# Patient Record
Sex: MALE | Race: BLACK OR AFRICAN AMERICAN | NOT HISPANIC OR LATINO | Employment: OTHER | URBAN - METROPOLITAN AREA
[De-identification: names, ages, dates, MRNs, and addresses within clinical notes are randomized per-mention and may not be internally consistent; named-entity substitution may affect disease eponyms.]

---

## 2017-02-14 ENCOUNTER — TELEPHONE (OUTPATIENT)
Dept: TRANSPLANT | Facility: CLINIC | Age: 32
End: 2017-02-14

## 2017-02-24 DIAGNOSIS — Z76.82 ORGAN TRANSPLANT CANDIDATE: Primary | ICD-10-CM

## 2017-04-12 ENCOUNTER — HOSPITAL ENCOUNTER (OUTPATIENT)
Dept: RADIOLOGY | Facility: HOSPITAL | Age: 32
Discharge: HOME | End: 2017-04-12
Attending: NURSE PRACTITIONER
Payer: MEDICARE

## 2017-04-12 ENCOUNTER — OFFICE VISIT (OUTPATIENT)
Dept: TRANSPLANT | Facility: CLINIC | Age: 32
End: 2017-04-12
Payer: MEDICARE

## 2017-04-12 ENCOUNTER — HOSPITAL ENCOUNTER (OUTPATIENT)
Dept: RADIOLOGY | Facility: HOSPITAL | Age: 32
Discharge: HOME | End: 2017-04-12
Attending: TRANSPLANT SURGERY
Payer: MEDICARE

## 2017-04-12 VITALS
HEIGHT: 72 IN | SYSTOLIC BLOOD PRESSURE: 148 MMHG | DIASTOLIC BLOOD PRESSURE: 96 MMHG | WEIGHT: 231.06 LBS | HEART RATE: 101 BPM | OXYGEN SATURATION: 100 % | BODY MASS INDEX: 31.3 KG/M2 | TEMPERATURE: 98 F | RESPIRATION RATE: 18 BRPM

## 2017-04-12 DIAGNOSIS — Z76.82 ORGAN TRANSPLANT CANDIDATE: ICD-10-CM

## 2017-04-12 DIAGNOSIS — Z01.818 PRE-TRANSPLANT EVALUATION FOR CHRONIC KIDNEY DISEASE: ICD-10-CM

## 2017-04-12 DIAGNOSIS — Z87.898 HISTORY OF SEIZURE: ICD-10-CM

## 2017-04-12 DIAGNOSIS — Z76.82 ORGAN TRANSPLANT CANDIDATE: Primary | ICD-10-CM

## 2017-04-12 DIAGNOSIS — H35.00 RETINOPATHY OF RIGHT EYE: ICD-10-CM

## 2017-04-12 DIAGNOSIS — E55.9 VITAMIN D DEFICIENCY: ICD-10-CM

## 2017-04-12 DIAGNOSIS — Z78.9 KNOWN MEDICAL PROBLEMS: ICD-10-CM

## 2017-04-12 DIAGNOSIS — K31.84 GASTROPARESIS DUE TO DM: ICD-10-CM

## 2017-04-12 DIAGNOSIS — E08.49 OTHER DIABETIC NEUROLOGICAL COMPLICATION ASSOCIATED WITH DIABETES MELLITUS DUE TO UNDERLYING CONDITION: ICD-10-CM

## 2017-04-12 DIAGNOSIS — E11.43 GASTROPARESIS DUE TO DM: ICD-10-CM

## 2017-04-12 DIAGNOSIS — G60.0 CHARCOT-MARIE-TOOTH DISEASE: ICD-10-CM

## 2017-04-12 DIAGNOSIS — E78.00 HIGH CHOLESTEROL: ICD-10-CM

## 2017-04-12 DIAGNOSIS — I15.0 RENOVASCULAR HYPERTENSION: ICD-10-CM

## 2017-04-12 DIAGNOSIS — N18.5 STAGE 5 CHRONIC KIDNEY DISEASE: Chronic | ICD-10-CM

## 2017-04-12 DIAGNOSIS — N28.9 NEPHROPATHY: ICD-10-CM

## 2017-04-12 PROBLEM — E11.21 TYPE 2 DIABETES MELLITUS WITH DIABETIC NEPHROPATHY: Chronic | Status: ACTIVE | Noted: 2017-04-12

## 2017-04-12 PROBLEM — E66.9 OBESITY (BMI 30-39.9): Chronic | Status: ACTIVE | Noted: 2017-04-12

## 2017-04-12 PROCEDURE — 76770 US EXAM ABDO BACK WALL COMP: CPT | Mod: TC,TXP

## 2017-04-12 PROCEDURE — 97802 MEDICAL NUTRITION INDIV IN: CPT | Mod: S$GLB,TXP,, | Performed by: DIETITIAN, REGISTERED

## 2017-04-12 PROCEDURE — 76700 US EXAM ABDOM COMPLETE: CPT | Mod: 26,TXP,, | Performed by: RADIOLOGY

## 2017-04-12 PROCEDURE — 71020 XR CHEST PA AND LATERAL: CPT | Mod: 26,TXP,, | Performed by: RADIOLOGY

## 2017-04-12 PROCEDURE — 93978 VASCULAR STUDY: CPT | Mod: 26,TXP,, | Performed by: RADIOLOGY

## 2017-04-12 PROCEDURE — 99204 OFFICE O/P NEW MOD 45 MIN: CPT | Mod: S$GLB,TXP,, | Performed by: NURSE PRACTITIONER

## 2017-04-12 PROCEDURE — 99215 OFFICE O/P EST HI 40 MIN: CPT | Mod: S$GLB,TXP,, | Performed by: NURSE PRACTITIONER

## 2017-04-12 PROCEDURE — 99499 UNLISTED E&M SERVICE: CPT | Mod: S$GLB,TXP,, | Performed by: NURSE PRACTITIONER

## 2017-04-12 PROCEDURE — 99999 PR PBB SHADOW E&M-EST. PATIENT-LVL IV: CPT | Mod: PBBFAC,TXP,, | Performed by: NURSE PRACTITIONER

## 2017-04-12 PROCEDURE — 99203 OFFICE O/P NEW LOW 30 MIN: CPT | Mod: S$GLB,TXP,, | Performed by: TRANSPLANT SURGERY

## 2017-04-12 PROCEDURE — 76700 US EXAM ABDOM COMPLETE: CPT | Mod: TC,TXP

## 2017-04-12 PROCEDURE — 93978 VASCULAR STUDY: CPT | Mod: TC,TXP

## 2017-04-12 PROCEDURE — 72170 X-RAY EXAM OF PELVIS: CPT | Mod: 26,TXP,, | Performed by: RADIOLOGY

## 2017-04-12 PROCEDURE — 1160F RVW MEDS BY RX/DR IN RCRD: CPT | Mod: S$GLB,TXP,, | Performed by: NURSE PRACTITIONER

## 2017-04-12 PROCEDURE — 99499 UNLISTED E&M SERVICE: CPT | Mod: S$GLB,TXP,, | Performed by: TRANSPLANT SURGERY

## 2017-04-12 PROCEDURE — 72170 X-RAY EXAM OF PELVIS: CPT | Mod: TC,TXP

## 2017-04-12 PROCEDURE — 71020 XR CHEST PA AND LATERAL: CPT | Mod: TC,TXP

## 2017-04-12 PROCEDURE — 76770 US EXAM ABDO BACK WALL COMP: CPT | Mod: 26,TXP,, | Performed by: RADIOLOGY

## 2017-04-12 PROCEDURE — 4010F ACE/ARB THERAPY RXD/TAKEN: CPT | Mod: S$GLB,TXP,, | Performed by: NURSE PRACTITIONER

## 2017-04-12 PROCEDURE — 3044F HG A1C LEVEL LT 7.0%: CPT | Mod: S$GLB,TXP,, | Performed by: NURSE PRACTITIONER

## 2017-04-12 RX ORDER — METOCLOPRAMIDE 10 MG/1
10 TABLET ORAL 2 TIMES DAILY
COMMUNITY

## 2017-04-12 RX ORDER — OMEPRAZOLE 40 MG/1
40 CAPSULE, DELAYED RELEASE ORAL DAILY
COMMUNITY

## 2017-04-12 RX ORDER — LISINOPRIL 40 MG/1
40 TABLET ORAL DAILY
COMMUNITY

## 2017-04-12 RX ORDER — SIMVASTATIN 20 MG/1
20 TABLET, FILM COATED ORAL NIGHTLY
COMMUNITY

## 2017-04-12 NOTE — PROGRESS NOTES
"PRE-TRANSPLANT NOTE:    This patient's medication therapy was evaluated as part of his pre-transplant evaluation.    The following pharmacologic concerns were noted: None    Current Outpatient Prescriptions   Medication Sig Dispense Refill    amlodipine (NORVASC) 10 MG tablet Take 10 mg by mouth once daily.   6    BD ULTRA-FINE CHIRAG PEN NEEDLES 32 x 5/32 " Ndle   0    blood sugar diagnostic (FREESTYLE TEST) Strp To test blood sugar before meals and at bedtime 200 strip 11    insulin aspart (NOVOLOG FLEXPEN) 100 unit/mL InPn pen Inject 10 Units into the skin 3 (three) times daily with meals. Plus sliding scale 1 Box 6    insulin detemir (LEVEMIR) 100 unit/mL injection Inject 30 Units into the skin 2 (two) times daily.      insulin needles, disposable, 32 x 5/32 " Ndle Use with multiple daily insulin injections 200 each 12    lancets (ONETOUCH DELICA LANCETS) 33 gauge Misc 1 lancet by Misc.(Non-Drug; Combo Route) route 4 (four) times daily before meals and nightly. 200 each 11    lisinopril (PRINIVIL,ZESTRIL) 40 MG tablet Take 40 mg by mouth once daily.      metoclopramide HCl (REGLAN) 10 MG tablet Take 10 mg by mouth 4 (four) times daily.      omeprazole (PRILOSEC) 40 MG capsule Take 40 mg by mouth once daily.      promethazine (PHENERGAN) 25 MG tablet Take 1 tablet (25 mg total) by mouth every 6 (six) hours as needed for Nausea. 90 tablet 2    simvastatin (ZOCOR) 20 MG tablet Take 20 mg by mouth every evening.      COMBIGAN 0.2-0.5 % Drop   2    hydrochlorothiazide (HYDRODIURIL) 12.5 MG Tab Take 1 tablet (12.5 mg total) by mouth once daily. 30 tablet 11    hydrocodone-acetaminophen 7.5-325mg (NORCO) 7.5-325 mg per tablet   0     No current facility-administered medications for this visit.        I am available for consultation and can be contacted, as needed by the other members of the [facility] team.     "

## 2017-04-12 NOTE — LETTER
April 13, 2017        Prabhjot Arce  [address]  [place] [address]  Phone: [phone]  Fax: [phone]             [facility]  [address]  [facility] [address]  Phone: [phone]   Patient: Mian Ball Jr.   MR Number: [id number]   YOB: 1985   Date of Visit: 4/12/2017       Dear Dr. Prabhjot Arce    Thank you for referring Mian Ball to me for evaluation. Attached you will find relevant portions of my assessment and plan of care.    If you have questions, please do not hesitate to call me. I look forward to following Mian Ball along with you.    Sincerely,    Salena Mane NP    Enclosure    If you would like to receive this communication electronically, please contact [email or web address] or [phone] to request [employer] access.    [employer] is a tool which provides read-only access to select patient information with whom you have a relationship. Its easy to use and provides real time access to review your patients record including encounter summaries, notes, results, and demographic information.    If you feel you have received this communication in error or would no longer like to receive these types of communications, please e-mail [email or web address]

## 2017-04-12 NOTE — PROGRESS NOTES
Transplant Recipient Adult Psychosocial Assessment    Mian Ball  [address]  [address]  [facility] [address]    Telephone Information:   Mobile [phone]   Home  [phone] (home)  Work  There is no work phone number on file.  E-mail  No e-mail address on record    Sex: male  YOB: 1985  Age: 31 y.o.    Encounter Date: 2017  [place] Citizen: yes  Primary Language: English   Needed: no    Emergency Contact:  Name: Laure Munoz  Relationship: mother  Address: same as pt  Phone Numbers:  [phone] (home)    Family/Social Support:   Number of dependents/: Pt reports no dependents   Marital history: Pt denies marital history and current relationship  Other family dynamics: Pt presents with his mother Laure. Pt also lives with his mother. Pt's father is  and the family is coping adequately. Pt reports is the eldest of 5 highly supportive siblings: Corbin, Candida,Lesvia,Major and Abdirizak. Pt reports all sisters live in [place], [place] and brothers live in [place].  Pt report family is highly supportive and communicative.     Household Composition:  Name: Laure Munoz  Age: none provided   Relationship: mother  Does person drive? yes    Do you and your caregivers have access to reliable transportation? yes Pt reports he and mother share transportation  PRIMARY CAREGIVER: Laure Munoz will be primary caregiver, phone number [phone].      provided in-depth information to patient and caregiver regarding pre- and post-transplant caregiver role.   strongly encourages patient and caregiver to have concrete plan regarding post-transplant care giving, including back-up caregiver(s) to ensure care giving needs are met as needed.    Patient and Caregiver states understanding all aspects of caregiver role/commitment and is able/willing/committed to being caregiver to the fullest extent necessary.    Patient and Caregiver verbalizes  "understanding of the education provided today and caregiver responsibilities.         remains available. Patient and Caregiver agree to contact  in a timely manner if concerns arise.      Able to take time off work without financial concerns: yes. Neither pt or mom work/responsible for dependents    Additional Significant Others who will Assist with Transplant:  Name: Corbin   Age: 29  Number: [phone]  City: [facility]: [place]  Relationship: sister  Does person drive? yes    Name: Candida   Age: none provided   Number: [phone]  City: [facility]: [place]  Relationship: sister  Does person drive? yes    Name: Lesvia   Age: none provided  Number: [phone]  City: [facility]: [place]  Relationship: sister  Does person drive? yes      Living Will: no  Healthcare Power of : no  Advance Directives on file: <<no information> per medical record. Pt reports trusting mother with medical decisions.   Verbally reviewed LW/HCPA information.   provided patient with copy of LW/HCPA documents and provided education on completion of forms.    Living Donors: No. Education and resource information given to patient. Pt is also a possible K/P recipient.      Highest Education Level: High School (9-12) or GED  Reading Ability: college  Reports difficulty with: seeing and wears glasses. Pt reports "blurriness" in right eye due to diabetes and reports having cataract surgery last year.   Learns Best By:  Pt reports as oral and hands-on learner     Status: yes: [employer], date: in 2006. Pt reports speanding one month in the  before learning he had diabetes and received an honorable discharge.   VA Benefits: no     Working for Income: No  If no, reason not working: Disability  Patient is disabled.  Prior to disability, patient  was employed as in law enforcement. Pt reports working for 5 years until health failed. .     Spouse/Significant Other Employment: Pt reports no s/o "     Disabled: yes: date disability began: 2013, due to: ESRD and diabetes .    Monthly Income:  SS Disability: $800  Food [place]: $130   Able to afford all costs now and if transplanted, including medications: yes  Pt reports budgeting well and is able to secure help from family.   Patient and Caregiver verbalizes understanding of personal responsibilities related to transplant costs and the importance of having a financial plan to ensure that patients transplant costs are fully covered.       provided fundraising information/education. Patient and Caregiververbalizes understanding.   remains available.    Insurance:   Payor/Plan Subscr  Sex Relation Sub. Ins. ID Effective Group Num   1. HU[employer]NA MANAGE* KVNG RIVERA JR. 1985 Male  [id number] 9/1/15 [id number]                                   [address] [address]   2. [place] KVNG RIVERA JR. 1985 Male  [id number]* 4/1/15                                    [address] [address]     Primary Insurance (for UNOS reporting): Private Insurance  Secondary Insurance (for UNOS reporting): Public Insurance - Medicaid  Patient and Caregiver verbalizes clear understanding that patient may experience difficulty obtaining and/or be denied insurance coverage post-surgery. This includes and is not limited to disability insurance, life insurance, health insurance, burial insurance, long term care insurance, and other insurances.      Patient and Caregiver also reports understanding that future health concerns related to or unrelated to transplantation may not be covered by patient's insurance.  Resources and information provided and reviewed.     Patient and Caregiver provides verbal permission to release any necessary information to outside resources for patient care and discharge planning.  Resources and information provided are reviewed.      Dialysis Adherence: Patient is not currently on dialysis and reports high adherence to all medical  "recommendations and appointments. Pt's nephrologist is Prabhjot Arce [phone].     Infusion Service: patient utilizing? no  Home Health: patient utilizing? no  DME: yes [facility] and insulin pen   Pulmonary/Cardiac Rehab: Pt denies    ADLS:  Pt reports independence with ADLs and does have difficulty walking. Pt reports will contact Podiatrist for a cane. SW also encouraged pt to contact insurance company for DME.     Adherence:  Pt reports past non-adherence to medical recommendations. Pt reports difficulty with diet in the past and "disregard for orders and did what I wanted". Pt reports is highly motivated.  SW provided active listening, adherence education and counseling provided. Pt verbalized understanding. Pt reports his nephrologist wants him to think about having a fistula placed in preparation for dialysis. Pt reports he does not want to look into a fistula "right now because I know there are natural cures out there. I am really into herbal remedies and have been researching things". SW provided acknowledgement, active listening, support and educations regarding the importance of acting on medical needs when it has been deemed pertinent. Pt verbalized understanding and reports he still has another week to make a decision.     Per History Section:  Past Medical History:   Diagnosis Date    Anemia     Cataract     Charcot-Laurie-Tooth disease     Coronary artery disease     Diabetes mellitus, type 2     Disorder of kidney and ureter     Gastritis, Helicobacter pylori 4/17/2015    EGD 2005, 11/09 Abx 5/09 Dr. Levy    Gastritis, Helicobacter pylori     Gastroparesis     Gastroparesis due to DM 4/17/2015    Study pos 1/11 Dr. Levy    HTN (hypertension) 4/17/2015 2005    Hyperlipidemia     Neuropathy     Retinopathy     Seizures      Social History   Substance Use Topics    Smoking status: Never Smoker    Smokeless tobacco: Never Used    Alcohol use No     History   Drug Use No     History " "  Sexual Activity    Sexual activity: Yes       Per Today's Psychosocial:  Tobacco: none, patient denies any use.  Alcohol: none, patient denies any use.  Illicit Drugs/Non-prescribed Medications: none, patient denies any use.    Patient and Caregiver states clear understanding of the potential impact of substance use as it relates to transplant candidacy and is aware of possible random substance screening.  Substance abstinence/cessation counseling, education and resources provided and reviewed.     Arrests/DWI/Treatment/Rehab: patient denies    Psychiatric History:    Mental Health: Pt denies history of anxiety, depression and or overwhelming feelings of sadness at this time or in the past. Pt did report the thought of dialysis as discomforting and reports high interest in "alternatives that are better than dialysis. I have two family members on dialysis and I know its difficult". Pt reports he has been testing out natural herbs and changed his diet with hopes of improved kidney function. SW provided active listening, support and encouraged pt to remain active in care as well as adhering to doctors recommendations. Pt verbalized understanding.    Psychiatrist/Counselor: Pt denies currently or in the past and reports willingness to meet with psychiatry if required by transplant.   Medications:  Pt denies utilizing mental health medications currently or in the past  Suicide/Homicide Issues: Pt denies feelings of wanting to harm self or other currently or in the past  Safety at home: Pt reports feeling safe at home.     Knowledge: Patient and Caregiver states having clear understanding and realistic expectations regarding the potential risks and potential benefits of organ transplantation and organ donation and agrees to discuss with health care team members and support system members, as well as to utilize available resources and express questions and/or concerns in order to further facilitate the pt informed " decision-making.  Resources and information provided and reviewed.    Patient and Caregiver is aware of [facility]'s affiliation and/or partnership with agencies in home health care, LTAC, SNF, [facility], and other hospitals and clinics.    Understanding: Patient and Caregiver reports having a clear understanding of the many lifetime commitments involved with being a transplant recipient, including costs, compliance, medications, lab work, procedures, appointments, concrete and financial planning, preparedness, timely and appropriate communication of concerns, abstinence (ETOH, tobacco, illicit non-prescribed drugs), adherence to all health care team recommendations, support system and caregiver involvement, appropriate and timely resource utilization and follow-through, mental health counseling as needed/recommended, and patient and caregiver responsibilities.  Social Service Handbook, resources and detailed educational information provided and reviewed.  Educational information provided.    Patient and Caregiver also reports current and expected compliance with health care regime and states having a clear understanding of the importance of compliance.      Patient and Caregiver reports a clear understanding that risks and benefits may be involved with organ transplantation and with organ donation.       Patient and Caregiver also reports clear understanding that psychosocial risk factors may affect patient, and include but are not limited to feelings of depression, generalized anxiety, anxiety regarding dependence on others, post traumatic stress disorder, feelings of guilt and other emotional and/or mental concerns, and/or exacerbation of existing mental health concerns.  Detailed resources provided and discussed.      Patient and Caregiver agrees to access appropriate resources in a timely manner as needed and/or as recommended, and to communicate concerns appropriately.  Patient and Caregiver also reports a clear  understanding of treatment options available.     Patient and Caregiver received education in a group setting.   reviewed education, provided additional information, and answered questions.    Feelings or Concerns: Pt denies having any concerns and or overwhelming feelings regarding transplant at this time.      Coping: Pt reports coping adequately with the transplant process through strong allison, cooking, family activities,postive influences and singing with his mother. Pt reports a zack for cooking and throws suppers for his neighborhood.     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 Patient referred to Vocational Rehabilitation.    Interview Behavior: Patient and Caregiver Laure presents as alert and oriented x 4, pleasant, good eye contact, well groomed, recall good, concentration/judgement good, calm, communicative, cooperative and asking and answering questions appropriately. Per pt request, pt's mother remained throughout assessment. Pt and mother were highly engaged and motivated for transplant.          Transplant Social Work - Candidacy  Assessment/Plan:     Psychosocial Suitability: Patient presents as a suitable candidate for transplant at this time pending adherence check. Based on psychosocial risk factors, patient presents as low risk, due to absence of psychosocial issues.     Recommendations/Additional Comments: SW recommends that pt conduct fundraising to assist pt with pay for cost of medications, food, gas, and other transplant related needs. SW recommends that pt remain aware of potential mental health concerns and contact the team if any concerns arise. SW recommends that pt remain abstinent from tobacco, ETOH, and drug use. SW supports pt's continued dialysis adherence. SW remains available to answer any questions or concerns that arise as the pt moves through the transplant process.         ALEENA Quinn

## 2017-04-12 NOTE — PROGRESS NOTES
Transplant Nephrology  Kidney Transplant Recipient Evaluation    Referring Physician: Prabhjot Arce  Current Nephrologist: Prabhjot Arce    Subjective:   CC:  Initial evaluation of kidney transplant candidacy.    HPI:  Mr. Ball is a 31 y.o. year old Black or  male who has presented to be evaluated as a potential kidney transplant recipient.  He has advanced kidney disease secondary to diabetic nephropathy.  Patient is currently pre-dialysis. He has a  no access at this time for dialysis access.     Previous Transplant: no    Past Medical and Surgical History: Mr. Ball  has a past medical history of Anemia; Cataract; Charcot-Kristi-Tooth disease; Coronary artery disease; Diabetes mellitus, type 2; Disorder of kidney and ureter; Gastritis, Helicobacter pylori; Gastritis, Helicobacter pylori; Gastroparesis; Gastroparesis due to DM; HTN (hypertension); Hyperlipidemia; Neuropathy; Retinopathy; and Seizures.  He has a past surgical history that includes Eye surgery and Cataract extraction (Right).    Past Social and Family History: Mr. Ball reports that he has never smoked. He has never used smokeless tobacco. He reports that he does not drink alcohol or use illicit drugs. His family history includes Cancer in his father; Diabetes in his father, maternal grandmother, paternal grandfather, and paternal grandmother; Heart disease in his father, paternal grandfather, and paternal grandmother; Hypertension in his father, maternal grandfather, paternal grandfather, and paternal grandmother; Neuropathy in his father; No Known Problems in his mother; Stroke in his father.    Denies kidney dz in his family HX  + family HX Diabetes, PGM and PGF    Diagnosed with diabetes type 2 at age 15. Was initially on oral meds and is now insulin dependant.   HX neuropathy, charcot kristi tooth dz and retinopathy  Denies problems with diabetic foot ulcers or wounds    He saw a podiatrist once last year but is managed  By his  PCP. He kellogg have instability problems bc he has trouble feeling the placement of his feet.   He does not uses assistive devices but wants to get a cane to help.     Reports having a seizure in 2016 --> he was brought to the ED and it was undermined as to the cause.   He was placed on a medication, which he does not remember the name of, for 2 months and it was d/c's. His PCP DR. Robledo was the treating MD.     Does not exercise or do much during the day.     Past medical HX notes CAD in 2010  Patient denies heart problems , just hypertension .  Does not see a cardiologist    Gastroparesis is reported as controlled, takes Reglan      No donors at this time       Does not appear frail, with good upper body build and strength. Does have some instability with walking and transferring to the table.  Denies chest pain, SOB, or leg pain.       Past Medical History:   Diagnosis Date    Anemia     Cataract     Charcot-Laurie-Tooth disease     Coronary artery disease     Diabetes mellitus, type 2     Disorder of kidney and ureter     Gastritis, Helicobacter pylori 4/17/2015    EGD 2005, 11/09 Abx 5/09 Dr. Levy    Gastritis, Helicobacter pylori     Gastroparesis     Gastroparesis due to DM 4/17/2015    Study pos 1/11 Dr. Levy    HTN (hypertension) 4/17/2015 2005    Hyperlipidemia     Neuropathy     Retinopathy     Seizures        Review of Systems   Constitutional: Positive for unexpected weight change. Negative for activity change, appetite change, chills, fatigue and fever.   HENT: Negative for congestion, facial swelling, postnasal drip, rhinorrhea, sinus pressure, sore throat and trouble swallowing.    Eyes: Negative for pain, redness and visual disturbance.        Retinopathy   Respiratory: Negative for cough, chest tightness, shortness of breath and wheezing.    Cardiovascular: Positive for leg swelling. Negative for chest pain and palpitations.   Gastrointestinal: Negative for abdominal pain, diarrhea,  nausea and vomiting.        Indigestion, occasional abdominal pain-- better since starting Reglan    Genitourinary: Negative for dysuria, flank pain and urgency.   Musculoskeletal: Positive for gait problem. Negative for neck pain and neck stiffness.        Peripheral neuropathy   Skin: Negative for rash.   Allergic/Immunologic: Negative for environmental allergies, food allergies and immunocompromised state.   Neurological: Positive for dizziness, weakness and headaches. Negative for light-headedness.        Lower extremity weakness  Dizzy if he moves too quickly   occasional headache    Psychiatric/Behavioral: Negative for agitation and confusion. The patient is not nervous/anxious.        Objective:   Blood pressure (!) 148/96, pulse 101, temperature 97.8 °F (36.6 °C), temperature source Oral, resp. rate 18, height 6' (1.829 m), weight 104.8 kg (231 lb 0.7 oz), SpO2 100 %.body mass index is 31.33 kg/(m^2).    Physical Exam   Constitutional: He is oriented to person, place, and time. He appears well-developed and well-nourished.   HENT:   Head: Normocephalic.   Mouth/Throat: Oropharynx is clear and moist. No oropharyngeal exudate.   Eyes: Conjunctivae and EOM are normal. Pupils are equal, round, and reactive to light. No scleral icterus.   Neck: Normal range of motion. Neck supple.   Cardiovascular: Normal rate, regular rhythm and normal heart sounds.    Pulmonary/Chest: Effort normal and breath sounds normal.   Abdominal: Soft. Normal appearance and bowel sounds are normal. He exhibits no distension and no mass. There is no splenomegaly or hepatomegaly. There is no tenderness. There is no rebound, no guarding, no CVA tenderness, no tenderness at McBurney's point and negative Sims's sign.   Musculoskeletal: Normal range of motion. He exhibits no edema.   Lymphadenopathy:     He has no cervical adenopathy.   Neurological: He is alert and oriented to person, place, and time. He exhibits normal muscle tone.  Coordination abnormal.   Skin: Skin is warm and dry.   Psychiatric: He has a normal mood and affect. His behavior is normal.   Vitals reviewed.      Labs:  Lab Results   Component Value Date    WBC 7.39 04/12/2017    HGB 10.0 (L) 04/12/2017    HCT 29.2 (L) 04/12/2017     04/12/2017    K 4.2 04/12/2017     (H) 04/12/2017    CO2 19 (L) 04/12/2017    BUN 42 (H) 04/12/2017    CREATININE 6.5 (H) 04/12/2017    EGFRNONAA 10.3 (A) 04/12/2017    CALCIUM 8.1 (L) 04/12/2017    PHOS 4.5 04/12/2017    MG 1.6 12/04/2015    ALBUMIN 2.3 (L) 04/12/2017    AST 24 04/12/2017    ALT 31 04/12/2017    UTPCR 5.54 (H) 01/20/2016    .0 (H) 04/12/2017       Lab Results   Component Value Date    BILIRUBINUA Negative 01/20/2016    AMYLASE 61 04/17/2015    LIPASE 14 06/22/2012    PROTEINUA 3+ (A) 01/20/2016    NITRITE Negative 01/20/2016    RBCUA 2 01/20/2016    WBCUA 2 01/20/2016       No results found for: HLAABCTYPE    Labs were reviewed with the patient.    Assessment:     1. Organ transplant candidate    2. Pre-transplant evaluation for chronic kidney disease    3. Stage 5 chronic kidney disease    4. IDDM (insulin dependent diabetes mellitus)    5. Renovascular hypertension    6. Gastroparesis due to DM    7. Nephropathy    8. High cholesterol    9. Retinopathy of right eye    10. Charcot-Laurie-Tooth disease    11. Vitamin D deficiency    12. Other diabetic neurological complication associated with diabetes mellitus due to underlying condition    13. BMI 31.0-31.9,adult    14. Known medical problems    15. History of seizure        Plan:   Body mass index is 31.33 kg/(m^2).  BMI not within guidelines for K/P consideration.  Encourage lifestyle modifications: diet, exercise, weight loss      Record from Dr Robledo in [place] r/t seizure     Podiatry clearance--> DM 2 with Charcot Laurie Tooth bilaterally  May benefit with PT for lower body muscle strengthening     Kidney allocation scheme was also discussed with the  patient.  Patient was advised that the average wait time for a kidney in [place] is 3-5 years     Kidney donor profile index (KPDI) and estimated post-transplant survival scores (EPTS) were reviewed. The benefits and risks of accepting a kidney with KDPI > 85% were explained to the patient. Patient verbalized understanding and consented to accept a kidney with KDPI > 85%       The side effects of immunosuppressants were reviewed that include but are not limited to the risk of infection, the risks of cancer (skin and lymphoma being most common), the risk of diabetes associated with prednisone use, acceleration of heart disease and diarrhea( this being more common post transplant).     Reviewed the hospital stay.  Reviewed the post transplant follow up.  Reviewed the importance of maintaining a good weight.  Reviewed the importance of controlling BP.  Reviewed the importance of following the renal diet.      Transplant Candidacy:   Based on available information, Mr. Ball is a suitable kidney transplant candidate assuming the remainder of the exam is unrevealing.  Final determination of transplant candidacy will be made once workup is complete and reviewed by the selection committee.    Salena Mane NP       OS Patient Status  Functional Status: 60% - Requires occasional assistance but is able to care for needs  Physical Capacity: No Limitations

## 2017-04-12 NOTE — PROGRESS NOTES
"TRANSPLANT NUTRITIONAL ASSESSMENT    Referring Provider: Salena Mane NP    Reason for Visit: Pre-kidney transplant work-up (pre-dialysis)    Age: 31 y.o.  Sex: male    Patient Active Problem List   Diagnosis    HTN (hypertension)    Gastroparesis due to DM    Gastritis, Helicobacter pylori    High cholesterol    Nephropathy    Known medical problems    Diabetic neuropathy    Retinopathy of right eye    Charcot-Laurie-Tooth disease    Vitamin D deficiency    Stage 5 chronic kidney disease    Type 2 diabetes mellitus with diabetic nephropathy    Obesity (BMI 30-39.9)     Past Medical History:   Diagnosis Date    Anemia     Cataract     Charcot-Laurie-Tooth disease     Coronary artery disease     Diabetes mellitus, type 2     Disorder of kidney and ureter     Gastritis, Helicobacter pylori 4/17/2015    EGD 2005, 11/09 Abx 5/09 Dr. Levy    Gastritis, Helicobacter pylori     Gastroparesis     Gastroparesis due to DM 4/17/2015    Study pos 1/11 Dr. Levy    HTN (hypertension) 4/17/2015 2005    Hyperlipidemia     Neuropathy     Retinopathy     Seizures      Lab Results   Component Value Date    GLU 98 04/12/2017    K 4.2 04/12/2017    PHOS 4.5 04/12/2017    MG 1.6 12/04/2015    CHOL 261 (H) 04/12/2017    HDL 77 (H) 04/12/2017    TRIG 78 04/12/2017    ALBUMIN 2.3 (L) 04/12/2017    HGBA1C 6.9 (H) 04/12/2017    CALCIUM 8.1 (L) 04/12/2017     Other Pertinent Labs: none    Current Outpatient Prescriptions   Medication Sig    amlodipine (NORVASC) 10 MG tablet Take 10 mg by mouth once daily.     BD ULTRA-FINE CHIRAG PEN NEEDLES 32 x 5/32 " Ndle     blood sugar diagnostic (FREESTYLE TEST) Strp To test blood sugar before meals and at bedtime    insulin aspart (NOVOLOG FLEXPEN) 100 unit/mL InPn pen Inject 10 Units into the skin 3 (three) times daily with meals. Plus sliding scale    insulin detemir (LEVEMIR) 100 unit/mL injection Inject 25 Units into the skin 2 (two) times daily.     insulin " "needles, disposable, 32 x 5/32 " Ndle Use with multiple daily insulin injections    lancets (ONETOUCH DELICA LANCETS) 33 gauge Misc 1 lancet by Misc.(Non-Drug; Combo Route) route 4 (four) times daily before meals and nightly.    lisinopril (PRINIVIL,ZESTRIL) 40 MG tablet Take 40 mg by mouth once daily.    metoclopramide HCl (REGLAN) 10 MG tablet Take 10 mg by mouth 2 (two) times daily.     omeprazole (PRILOSEC) 40 MG capsule Take 40 mg by mouth once daily.    promethazine (PHENERGAN) 25 MG tablet Take 1 tablet (25 mg total) by mouth every 6 (six) hours as needed for Nausea.    simvastatin (ZOCOR) 20 MG tablet Take 20 mg by mouth every evening.    COMBIGAN 0.2-0.5 % Drop     hydrochlorothiazide (HYDRODIURIL) 12.5 MG Tab Take 1 tablet (12.5 mg total) by mouth once daily.     No current facility-administered medications for this visit.      Allergies: Ibuprofen    Ht Readings from Last 1 Encounters:   04/12/17 6' (1.829 m)     Wt Readings from Last 1 Encounters:   04/12/17 104.8 kg (231 lb 0.7 oz)      BMI: Body mass index is 31.33 kg/(m^2).    Usual Weight: 185 lb  Weight Change/Time: weighed about 210 lb 3 weeks ago, significant weight gain unintentional  Current Diet: Diabetic  Appetite/Current Intake: good   Exercise/Physical Activity: fully functional in ADLs, walking, no exercise  Nutritional/Herbal Supplements: none  Chewing/Swallowing Problems: none  Symptoms: none    Estimated Kcal Need: 1745-1722 kcal (20-25 kcal/kg)  Estimated Protein Need:  gm (0.8-1 gm/kg)    Nutritional History:   Pt present with caregiver today. Pt reports appetite is good, no n/v/d/abd pain. He has gastroparesis and reflux and has started on reglan about 2 weeks ago with significant positive results. He states he was not able to eat much before the reglan, always feeling full, poor appetite, some nausea. His reflux has also improved significant, leading pt to be able to eat more. He did not realize how much weight he had " "gained. Pt checks BG throughout the day, usually it is , he states he knows if his BG is over 200 it is because he had more carbohydrates than usual. Pt provided the following diet recall:  B: no food, water with meds  L: [employer] grilled chicken sandwich 12", Diet Coke OR frozen salmon stuffed w/ lobster filet, green beans or asparagus OR sandwich turkey/ham/chicken breast) OR hamburger helper w/ garlic bread (1), zero calorie gatorade or water w/lemon & stevia  D: usually at home, red beans & rice, corn bread OR baked chicken, cabbage, rice OR greens & rice (ham) OR beef pot roast, potatoes  Snack: 1-2 pack cheese or peanut butter crackers OR wheat bread w/ peanut butter and strawberry preserve, occasional yogurt, fresh fruit, cashews/almonds/peanuts  Beverages: water, diet soda    Nutritional Diagnoses  Problem: food- and nutrition-related knowledge deficit  Etiology: r/t no prior edu on pre HD, pre Ktx diet   Symptoms: aeb diet recall, pt questions    Educational Need? yes  Barriers: none identified  Discussed with: patient and caregiver  Interventions: Patient taught nutrition information regarding Pre-kidney transplant work-up (pre-dialysis).    Renal Nutrition Therapy packet reviewed ( high/low food sources of K, Phos and protein, low sodium and fluid intake, emphasis on moderate protein intake)   Encouraged weight monitoring, minimal weight loss would be ideal, monitor portion sizes, physical activity daily.   Pt can join a fitness center with insurance financial assistance.  Goals/Recommendations: diet adherence, gradual weight loss and limit intake of high phosphorus foods  Actions Taken: instruct/provide written information  Patient and/or family comprehend instructions: yes  Outcome: Verbalizes understanding  Monitoring: Follow up in clinic if needed. RD contact info provided.    Counseling Time: 20 minutes      "

## 2017-04-12 NOTE — PROGRESS NOTES
Transplant Surgery  Kidney Transplant Recipient Evaluation    Referring Physician: Prabhjot Arce  Current Nephrologist: Prabhjot Arce    Subjective:     Reason for Visit: evaluate transplant candidacy    History of Present Illness: Mian Ball is a 31 y.o. year old male undergoing transplant evaluation.    Dialysis History: Mian is pre-dialysis.      Transplant History: N/A    Etiology of Renal Disease: Diabetes Mellitus - Type I (Pancreas) (based on medical records from referral).    Review of Systems   Respiratory: Negative for shortness of breath and wheezing.    Cardiovascular: Negative for chest pain and leg swelling.   All other systems reviewed and are negative.      Objective:     Physical Exam:  Constitutional:   Vitals reviewed: yes   Well-nourished and well-groomed: yes  Eyes:   Sclerae icteric: no   Extraocular movements intact: yes  GI:    Bowel sounds normal: yes   Tenderness: no    If yes, quadrant/location: not applicable   Palpable masses: no    If yes, quadrant/location: not applicable   Hepatosplenomegaly: no   Ascites: no   Hernia: no    If yes, type/location: not applicable   Surgical scars: no    If yes, type/location: not applicable  Resp:   Effort normal: yes   Breath sounds normal: yes    CV:   Regular rate and rhythm: yes   Heart sounds normal: yes   Femoral pulses normal: yes   Extremities edematous: no  Skin:   Rashes or lesions present: no    If yes, describe:not applicable   Jaundice:: no    Musculoskeletal:   Gait normal: yes   Strength normal: yes  Psych:   Oriented to person, place, and time: yes   Affect and mood normal: yes    Additional comments: not applicable    Counseling: We provided Mian Ball Jr. with a group education session today.  We discussed kidney transplantation at length with him, including risks, potential complications, and alternatives in the management of his renal failure.  The discussion included complications related to anesthesia, bleeding, infection,  primary nonfunction, and ATN.  I discussed the typical postoperative course, length of hospitalization, the need for long-term immunosuppression, and the need for long-term routine follow-up.  I discussed living-donor and -donor transplantation and the relative advantages and disadvantages of each.  I also discussed average waiting times for both living donation and  donation.  I discussed national and center-specific survival rates.  I also mentioned the potential benefit of multicenter listing to candidates listed with centers within more than one organ procurement organization.  All questions were answered.    Final determination of transplant candidacy will be made once evaluation is complete and reviewed by the Kidney & Kidney/Pancreas Selection Committee.         Transplant Surgery - Candidacy   Assessment/Plan:   Mian Ball Jr. is pre-dialysis with CKD stage 4 (GFR 15-29 mL/min). I see no surgical contraindication to placing a kidney transplant. Based on available information, Mian Ball Jr. is an excellent kidney transplant candidate.     Andrew Mcdonnell MD

## 2017-04-12 NOTE — PROGRESS NOTES
INITIAL PATIENT EDUCATION NOTE    Mr. Mian Ball Jr. was seen in pre-kidney transplant clinic for evaluation for kidney, kidney/pancreas or pancreas only transplant.  The patient attended a group education session that discussed/reviewed the following aspects of transplantation: evaluation and selection committee process, [facility] waitlist management/multiple listings, types of organs offered (KDPI < 85%, KDPI > 85%, PHS increased risk, DCD), financial aspects, surgical procedures, dietary instruction pre- and post-transplant, health maintenance pre- and post-transplant, post-transplant hospitalization and outpatient follow-up, potential to participate in a research protocol, and medication management and side effects.  A question and answer session was provided after the presentation.    The patient was seen by all members of the multi-disciplinary team to include: Nephrologist/PA, Surgeon, , Transplant Coordinator, , Pharmacist and Dietician (if applicable).    The patient reviewed and signed all consents for evaluation which were witnessed and sent to scanning into the EPIC chart.    The patient was given an education book and plan for further evaluation based on his individual assessment.      The patient was encouraged to call with any questions or concerns.

## 2017-04-13 ENCOUNTER — DOCUMENTATION ONLY (OUTPATIENT)
Dept: TRANSPLANT | Facility: CLINIC | Age: 32
End: 2017-04-13

## 2017-04-13 NOTE — NURSING
Labs faxed to Nephrologist office.    Results reviewed, action needed.   Fax labs to dialysis/nephrologist concerning  Hypoalbuminemia/ ALB 2.3   Needs to maintain an acceptable ALB for TXP

## 2017-07-06 DIAGNOSIS — Z76.82 ORGAN TRANSPLANT CANDIDATE: Primary | ICD-10-CM

## 2017-09-08 PROBLEM — Z01.810 PREOP CARDIOVASCULAR EXAM: Status: ACTIVE | Noted: 2017-09-08

## 2023-09-14 NOTE — PROGRESS NOTES
Pre Transplant Infectious Diseases Consult  Kidney Transplant Recipient Evaluation    Requesting Physician:  Salena Mane    Reason for Visit:  No chief complaint on file.    History of Present Illness  Mian Ball is a 31 y.o. year old Black or  male with advanced Kidney disease currently being evaluated for Kidney transplant. He is currently pre-dialysis.  He has no dialysis access.  Patient denies any recent fever, chills, or infective illnesses.     Reports prior treatment for H Pylori.      1) Do you have a history of:   YES NO   Diabetes      [x] []     Diabetic Foot Infection/Bone Infection  []        [x]     Surgical Removal of Spleen   []  [x]                  2) Have you had recurrent infections involving:             YES NO  Sinus infections  []         [x]   Sore Throat   []         [x]                 Prostate Infections  []         [x]              Bladder Infections  []         [x]                     Kidney Infections  []         [x]                               Intestinal Infections  []         [x]      Skin Infections   []         [x]       Reproductive Infections          []  [x]   Periodontal Disease  []         [x]        3)Have you ever had: YES     NO UNKNOWN      Chicken Pox   []         [x]  []   Shingles   []         [x]  []   Orolabial Herpes             []  [x]  []   Genital Herpes  []         [x]  []   Cytomegalovirus  []         [x]  []   Harrison-Barr Virus  []         [x]  []   Genital Warts   []         [x]  []   Hepatitis A   []         [x]  []   Hepatitis B   []         [x]  []   Hepatitis C   []         [x]  []   Syphilis   []         [x]  []   Gonorrhea   []         [x]  []   Pelvic Inflammatory   Disease   []         [x]  []   Chlamydia Infection  []         [x]  []   Intestinal parasites   or worms   []         [x]  []   Fungal Infections  []         [x]  []   Blood Infections  []         [x]  []       Comment:       4) Have you ever been exposed   YES NO  To  someone with tuberculosis?  []   [x]   If yes, what treatment did you receive:     5) What states have you lived in? [place], [place]     6) What countries have you visited for more than 2 weeks?  No foreign travel                       YES NO  7) Did you have any associated infections?  []  [] n/a      8) Are you planning to travel outside the    []  [x]   [place] after your transplant?    9) Household                   YES NO  Do you have pets living in your house?    [x]         []   If yes, describe: two dogs       Do you spend time or live on a farm or     []         [x]   have livestock or other farm animals?  If yes, which ones:    Do you have a fish tank?          []  [x]       Do you have a litter box?      []         [x]     Do you fish or hunt?       []         [x]     Do you clean or skin fish or animals?    []         [x]     Do you consume raw or undercooked    []         [x]   meat, fish, or shellfish?      10) What occupations have you had? Currently disabled.  Deputy stack previously in [place]   11) Patient reports hobby to be indoor activities  .          12)Do you garden or otherwise  YES NO   work in the soil?    []         [x]   13)Do you hike, camp, or spend     time in wooded areas?   []         [x]        14) The patient's immunization history was reviewed.    Have you ever received:  YES NO UNKNOWN DATES   Routine Childhood vaccines  [x]         []  []      Influenza vaccine   []  [x]  []    Pneumovax    [x]  []  [] More than 5 years     Tetanus-diptheria   [x]         []  [] 2008   Hepatitis A vaccine series       []  [x]  []    Hepatitis B vaccine series         []  [x]  []    Meningitis vaccine   [x]         []  []    Varicella vaccine   []         []  [x]        Based on the patients immunization history and serologies, immunizations were ordered:         Ordered  Not Ordered  Influenza Vaccine     [x]    []   Hepatitis A series at 0,  6 months   [x]    []   Hepatitis B  seriesat 0, 1, and 6 months  [x]    []   Hepatitis B High Dose 0,1, and 6 months  []    []   Prevnar      [x]    []   Pneumovax      [x]    []    TDap       [x]    []    Zoster       []    [x]   Menactra      []    [x]            The patient was encouraged to contact us about any problems that may develop after immunization and possible side effects were reviewed.      Previous Transplant: no    Etiology of Kidney Disease: diabetic nephropathy    Allergies: Ibuprofen  Immunization History   Administered Date(s) Administered    Td (ADULT) 11/01/2008     Past Medical History:   Diagnosis Date    Diabetes mellitus, type 2     Gastritis, Helicobacter pylori 4/17/2015    EGD 2005, 11/09 Abx 5/09 Dr. Levy    Gastroparesis due to DM 4/17/2015    Study pos 1/11 Dr. Levy    HTN (hypertension) 4/17/2015 2005    Hyperlipidemia      History reviewed. No pertinent surgical history.   Social History     Social History    Marital status: Single     Spouse name: N/A    Number of children: 0    Years of education: N/A     Occupational History    Prev. [employer]      Social History Main Topics    Smoking status: Never Smoker    Smokeless tobacco: Not on file    Alcohol use No    Drug use: No    Sexual activity: Yes     Other Topics Concern    Not on file     Social History Narrative    No exercise       Review of Systems   Constitution: Positive for chills (cold - related to anemia), decreased appetite, weakness and night sweats (occasional ). Negative for fever, malaise/fatigue, weight gain and weight loss.   HENT: Negative for congestion, ear pain, headaches, hearing loss, hoarse voice, sore throat and tinnitus.    Eyes: Positive for blurred vision. Negative for redness and visual disturbance.   Cardiovascular: Positive for leg swelling. Negative for chest pain and palpitations.   Respiratory: Positive for shortness of breath (with exertion ). Negative for cough, hemoptysis, sputum  production and wheezing.    Endocrine: Negative for cold intolerance and heat intolerance.   Hematologic/Lymphatic: Negative for adenopathy. Does not bruise/bleed easily.   Skin: Negative for dry skin, itching, rash and suspicious lesions.   Musculoskeletal: Negative for back pain, joint pain, myalgias and neck pain.   Gastrointestinal: Positive for abdominal pain, heartburn, nausea and vomiting. Negative for constipation and diarrhea.   Genitourinary: Negative for dysuria, flank pain, frequency, hematuria, hesitancy and urgency.   Neurological: Positive for dizziness (after activity, ? orthostasis). Negative for numbness and paresthesias.   Psychiatric/Behavioral: Negative for depression and memory loss. The patient does not have insomnia and is not nervous/anxious.    Allergic/Immunologic: Negative for environmental allergies, HIV exposure, hives and persistent infections.     Physical Exam   Constitutional: He is oriented to person, place, and time. He appears well-developed and well-nourished.   HENT:   Head: Normocephalic and atraumatic.   Mouth/Throat: Uvula is midline, oropharynx is clear and moist and mucous membranes are normal. He does not have dentures. No oral lesions. Normal dentition. No dental abscesses, lacerations or dental caries.   Eyes: Conjunctivae and lids are normal. No scleral icterus.   Neck: Neck supple.   Cardiovascular: Normal rate and regular rhythm.  Exam reveals no gallop and no friction rub.    No murmur heard.  Pulmonary/Chest: Effort normal and breath sounds normal. No respiratory distress. He has no decreased breath sounds. He has no wheezes. He has no rhonchi. He has no rales.   Abdominal: Soft. Normal appearance and bowel sounds are normal. He exhibits no distension and no mass. There is no hepatosplenomegaly. There is no tenderness. There is no rebound and no guarding.   Musculoskeletal: He exhibits edema (bilateral ankle edema. ).   Lymphadenopathy:        Head (right side): No  submental, no submandibular, no tonsillar, no preauricular, no posterior auricular and no occipital adenopathy present.        Head (left side): No submental, no submandibular, no tonsillar, no preauricular, no posterior auricular and no occipital adenopathy present.     He has no cervical adenopathy.     He has no axillary adenopathy.        Right: No inguinal, no supraclavicular and no epitrochlear adenopathy present.        Left: No inguinal, no supraclavicular and no epitrochlear adenopathy present.   Neurological: He is alert and oriented to person, place, and time. No cranial nerve deficit.   Skin: Skin is warm, dry and intact. No lesion and no rash noted. He is not diaphoretic. No erythema. No pallor.   Psychiatric: He has a normal mood and affect. His behavior is normal.     Diagnostics: No results found for: RPR  No results found for: CMVANTIBODIE  No results found for: HIV1X2  No results found for: HTLVIIIANTIB  No results found for: HEPBSAG  No results found for: HEPBCAB  No results found for: HEPCAB  No results found for: TOXOIGG  No components found for: TOXOIGGINTER  No results found for: ESV5XIS  No results found for: HKO2MSZ  No results found for: VARICELLAZOS  No results found for: VARICELLAINT  No results found for: STRONGANTIGG  No results found for: EPSTEINBARRV  No results found for: HEPBSAB  No results found for: QUANTIFERON  No results found for: HEPAIGM  No results found for: PPD  No results found for this or any previous visit.         Transplant Infectious Diseases - Candidacy    Assessment/Plan:     Transplant Candidacy: Based on available information, there are no identified significant barriers to transplantation from an infectious disease standpoint pending acceptable serologies.      1.  Immunizations today:  Hep A/B series initiated, Prevnar, Tdap, Flu.  Rx given to complete Hep A/B series and for Pneumovax in 8 weeks.  The patient was encouraged to contact us about any problems that  may develop after immunization and possible side effects were reviewed.     2.  Serologies pending. Please consult ID if strongyloides, RPR or QG positive.  If QG indeterminate, please draw T spot.  If T spot positive, please consult ID.     Final determination of transplant candidacy will be made once evaluation is complete and reviewed by the Transplant Selection Committee.    Casie Polk APRN, ANP         Counseling:     I discussed with Mian and his mother the risk for increased susceptibility to infections following transplantation including increased risk for infection right after transplant and if rejection should occur.  The patient has been counseled on the importance of vaccinations including but not limited to a yearly flu vaccine.  Specific guidance has been provided to the patient regarding the patients occupation, hobbies and activities to avoid future infectious complications including but not limited to avoiding undercooked meats and seafood, proper hygiene, and contact with animals.   14-Sep-2023 00:47